# Patient Record
Sex: MALE | Race: WHITE | URBAN - METROPOLITAN AREA
[De-identification: names, ages, dates, MRNs, and addresses within clinical notes are randomized per-mention and may not be internally consistent; named-entity substitution may affect disease eponyms.]

---

## 2020-12-31 ENCOUNTER — NEW PATIENT (OUTPATIENT)
Dept: URBAN - METROPOLITAN AREA CLINIC 30 | Facility: CLINIC | Age: 70
End: 2020-12-31
Payer: MEDICARE

## 2020-12-31 DIAGNOSIS — H02.834 DERMATOCHALASIS OF LEFT UPPER LID: ICD-10-CM

## 2020-12-31 PROCEDURE — 92004 COMPRE OPH EXAM NEW PT 1/>: CPT | Performed by: OPTOMETRIST

## 2020-12-31 PROCEDURE — 92134 CPTRZ OPH DX IMG PST SGM RTA: CPT | Performed by: OPTOMETRIST

## 2020-12-31 RX ORDER — CYCLOSPORINE 0.5 MG/ML
0.05 % EMULSION OPHTHALMIC
Qty: 60 | Refills: 6 | Status: ACTIVE
Start: 2020-12-31

## 2020-12-31 ASSESSMENT — KERATOMETRY
OD: 42.86
OS: 42.77

## 2020-12-31 ASSESSMENT — VISUAL ACUITY
OS: 20/20
OD: 20/20

## 2020-12-31 ASSESSMENT — INTRAOCULAR PRESSURE
OD: 12
OS: 11

## 2022-03-23 ENCOUNTER — OFFICE VISIT (OUTPATIENT)
Dept: URBAN - METROPOLITAN AREA CLINIC 30 | Facility: CLINIC | Age: 72
End: 2022-03-23
Payer: MEDICARE

## 2022-03-23 DIAGNOSIS — H04.123 TEAR FILM INSUFFICIENCY OF BILATERAL LACRIMAL GLANDS: ICD-10-CM

## 2022-03-23 DIAGNOSIS — H02.831 DERMATOCHALASIS OF RIGHT UPPER LID: ICD-10-CM

## 2022-03-23 DIAGNOSIS — H25.813 COMBINED FORMS OF AGE-RELATED CATARACT, BILATERAL: ICD-10-CM

## 2022-03-23 DIAGNOSIS — H43.393 OTHER VITREOUS OPACITIES, BILATERAL: ICD-10-CM

## 2022-03-23 DIAGNOSIS — H53.2 DIPLOPIA: Primary | ICD-10-CM

## 2022-03-23 PROCEDURE — 92134 CPTRZ OPH DX IMG PST SGM RTA: CPT | Performed by: OPTOMETRIST

## 2022-03-23 PROCEDURE — 92014 COMPRE OPH EXAM EST PT 1/>: CPT | Performed by: OPTOMETRIST

## 2022-03-23 ASSESSMENT — INTRAOCULAR PRESSURE
OS: 12
OD: 12

## 2022-03-23 ASSESSMENT — KERATOMETRY
OS: 42.88
OD: 43.10

## 2022-03-23 ASSESSMENT — VISUAL ACUITY
OS: 20/25
OD: 20/25

## 2022-03-23 NOTE — IMPRESSION/PLAN
Impression: Tear film insufficiency of bilateral lacrimal glands Plan: Cont, increase Restasis bid OU. Cont AT's qid OU. O3's. Avoid ceiling fans. +sleep mask. Consider plugs. Cont WC's. Cont OTC gel or brady. No concretions noted today.

## 2022-03-23 NOTE — IMPRESSION/PLAN
Impression: Dermatochalasis of right upper lid Plan: Asymptomatic currently. Monitor for now. Stable.

## 2022-03-23 NOTE — IMPRESSION/PLAN
Impression: Combined forms of age-related cataract, bilateral
***Retired Anesthesiologist MD. Plan: No treatment currently recommended due to level of vision. Patient will monitor vision changes and contact us with any decrease in vision, will re-evaluate cataract on return visit. Stable.

## 2022-03-23 NOTE — IMPRESSION/PLAN
Impression: Diplopia ; OU Plan: Remains stable. Hyperphoria or slight Hypotropia OS. Long standing. 1 base up OS.

## 2023-03-10 ENCOUNTER — OFFICE VISIT (OUTPATIENT)
Dept: URBAN - METROPOLITAN AREA CLINIC 30 | Facility: CLINIC | Age: 73
End: 2023-03-10
Payer: MEDICARE

## 2023-03-10 DIAGNOSIS — H25.813 COMBINED FORMS OF AGE-RELATED CATARACT, BILATERAL: Primary | ICD-10-CM

## 2023-03-10 DIAGNOSIS — H02.834 DERMATOCHALASIS OF LEFT UPPER LID: ICD-10-CM

## 2023-03-10 DIAGNOSIS — H04.123 TEAR FILM INSUFFICIENCY OF BILATERAL LACRIMAL GLANDS: ICD-10-CM

## 2023-03-10 DIAGNOSIS — H43.393 OTHER VITREOUS OPACITIES, BILATERAL: ICD-10-CM

## 2023-03-10 DIAGNOSIS — H53.2 DIPLOPIA: ICD-10-CM

## 2023-03-10 DIAGNOSIS — H02.831 DERMATOCHALASIS OF RIGHT UPPER LID: ICD-10-CM

## 2023-03-10 PROCEDURE — 92014 COMPRE OPH EXAM EST PT 1/>: CPT | Performed by: OPTOMETRIST

## 2023-03-10 PROCEDURE — 92134 CPTRZ OPH DX IMG PST SGM RTA: CPT | Performed by: OPTOMETRIST

## 2023-03-10 RX ORDER — VIT E/DHA/LUT/ZEAX/ASTAX/BILB 25-220-5
CAPSULE ORAL
Qty: 120 | Refills: 12 | Status: ACTIVE
Start: 2023-03-10

## 2023-03-10 ASSESSMENT — KERATOMETRY
OD: 43.00
OS: 42.63

## 2023-03-10 ASSESSMENT — VISUAL ACUITY
OD: 20/20
OS: 20/20

## 2023-03-10 NOTE — IMPRESSION/PLAN
Impression: Diplopia ; OU Plan: Again, remains stable. Hyperphoria or slight Hypotropia OS. Long standing. 1 base up OS.

## 2023-03-10 NOTE — IMPRESSION/PLAN
Impression: Tear film insufficiency of bilateral lacrimal glands Plan: Continue Restasis bid OU. Cont AT's qid OU. O4's- rx'd [place] . Avoid ceiling fans. +sleep mask. Cont WC's. Cont OTC gel or brady. Again, no concretions noted today. Consider plugs.

## 2023-03-10 NOTE — IMPRESSION/PLAN
Impression: Dermatochalasis of right upper lid Plan: Asymptomatic currently. Monitor for now. Remains stable.

## 2023-03-10 NOTE — IMPRESSION/PLAN
Impression: Combined forms of age-related cataract, bilateral
***Retired Anesthesiologist MD. Plan: No treatment currently recommended due to level of vision. Patient will monitor vision changes and contact us with any decrease in vision, will re-evaluate cataract on return visit. Stable. Monitor.

## 2024-05-24 ENCOUNTER — OFFICE VISIT (OUTPATIENT)
Dept: URBAN - METROPOLITAN AREA CLINIC 30 | Facility: CLINIC | Age: 74
End: 2024-05-24
Payer: MEDICARE

## 2024-05-24 DIAGNOSIS — H04.123 TEAR FILM INSUFFICIENCY OF BILATERAL LACRIMAL GLANDS: ICD-10-CM

## 2024-05-24 DIAGNOSIS — H53.2 DIPLOPIA: ICD-10-CM

## 2024-05-24 DIAGNOSIS — H25.813 COMBINED FORMS OF AGE-RELATED CATARACT, BILATERAL: Primary | ICD-10-CM

## 2024-05-24 DIAGNOSIS — H43.393 OTHER VITREOUS OPACITIES, BILATERAL: ICD-10-CM

## 2024-05-24 DIAGNOSIS — H52.4 PRESBYOPIA: ICD-10-CM

## 2024-05-24 PROCEDURE — 92014 COMPRE OPH EXAM EST PT 1/>: CPT | Performed by: OPTOMETRIST

## 2024-05-24 RX ORDER — CYCLOSPORINE 0.5 MG/ML
0.05 % EMULSION OPHTHALMIC
Qty: 60 | Refills: 6 | Status: ACTIVE
Start: 2024-05-24

## 2024-05-24 ASSESSMENT — KERATOMETRY
OS: 42.73
OD: 42.97

## 2024-05-24 ASSESSMENT — VISUAL ACUITY
OD: 20/20
OS: 20/30

## 2024-05-24 ASSESSMENT — INTRAOCULAR PRESSURE
OS: 12
OD: 11

## 2024-06-18 ENCOUNTER — OFFICE VISIT (OUTPATIENT)
Dept: URBAN - METROPOLITAN AREA CLINIC 30 | Facility: CLINIC | Age: 74
End: 2024-06-18
Payer: MEDICARE

## 2024-06-18 DIAGNOSIS — H53.2 DIPLOPIA: Primary | ICD-10-CM

## 2024-06-18 DIAGNOSIS — H52.4 PRESBYOPIA: ICD-10-CM

## 2024-06-18 PROCEDURE — 92015 DETERMINE REFRACTIVE STATE: CPT | Performed by: OPTOMETRIST

## 2024-06-18 ASSESSMENT — VISUAL ACUITY
OS: 20/20
OD: 20/20